# Patient Record
Sex: MALE | Race: WHITE | Employment: STUDENT | URBAN - METROPOLITAN AREA
[De-identification: names, ages, dates, MRNs, and addresses within clinical notes are randomized per-mention and may not be internally consistent; named-entity substitution may affect disease eponyms.]

---

## 2024-10-31 NOTE — PROGRESS NOTES
Weston Mcginnis   1980, 44 y.o. male   [id number]       Referring Provider: Oliver Will DO  Referral Type: In an order in Epic    Reason for Visit: Evaluation of the cause of disorders of hearing, tinnitus, or balance.    ADULT AUDIOLOGIC EVALUATION      Weston Mcginnis is a 44 y.o. male seen today, 11/1/2024 , for an initial audiologic evaluation.  Patient was seen by Oliver Will DO following today's evaluation. Patient was alone.    AUDIOLOGIC AND OTHER PERTINENT MEDICAL HISTORY:      Weston Mcginnis noted tinnitus and history of occupational noise exposure.  Patient reports constant left ear tinnitus after having a cold around two years ago.  He has a history of occupational and  noise exposure, [employer] and [employer], with the use of hearing protection.      Weston Mcginnis denied otalgia, aural fullness, dizziness, history of head trauma, history of ear surgery, and family history of hearing loss.    Date: 11/1/2024     IMPRESSIONS:      Normal middle ear pressure and compliance, bilaterally.  Abnormal asymmetric high frequency hearing sensitivity, left worse than right, which can coincide with tinnitus symptoms.  Word understanding was excellent presented at normal conversation level, bilaterally.  Discussed tinnitus management strategies.  Follow medical recommendations of Oliver Will DO.     ASSESSMENT AND FINDINGS:     Otoscopy revealed: Clear ear canals bilaterally    RIGHT EAR:  Hearing Sensitivity: Normal hearing sensitivity to a mild sensorineural hearing loss from 250-8000 Hz  Speech Recognition Threshold: 0 dB HL  Word Recognition:Excellent (100%), based on NU-6 Ordered-by-Difficulty 10-word list at 40 dBHL using recorded speech stimuli.    Tympanometry: Normal peak pressure and compliance, Type A tympanogram, consistent with normal middle ear function.      LEFT EAR:  Hearing Sensitivity: Normal hearing sensitivity from 250-4000 Hz sloping to a moderately severe sensorineural hearing loss from 3366-2023 Hz  Speech

## 2024-10-31 NOTE — PROGRESS NOTES
[facility]  DIVISION OF OTOLARYNGOLOGY- HEAD & NECK SURGERY  CONSULT    Patient Name: Weston Mcginnis  Medical Record Number:  [id number]  Primary Care Physician:  Amara Gilmore APRN - NP  Date of Consultation: 11/1/2024    Chief Complaint:   Chief Complaint   Patient presents with    Tinnitus     Muffled hearing & left ear tinnitus.      HISTORY OF PRESENT ILLNESS  Weston is a(n) 44 y.o. male who Presents for evaluation of left-sided ringing and muffled hearing.  He states this been going on for approximately 2 years.  He had URI and after the URI noticed decreased hearing on the left side with some ringing.  He was placed on steroids without any relief of his symptoms.  He states that this ringing has improved but he is still hearing consistently  There is no problem list on file for this patient.    No past surgical history on file.  No family history on file.  Social History     Socioeconomic History    Marital status:      Spouse name: Not on file    Number of children: Not on file    Years of education: Not on file    Highest education level: Not on file   Occupational History    Not on file   Tobacco Use    Smoking status: Never    Smokeless tobacco: Never   Vaping Use    Vaping status: Never Used   Substance and Sexual Activity    Alcohol use: Yes     Alcohol/week: 2.0 standard drinks of alcohol     Types: 2 Cans of beer per week     Comment: per day    Drug use: Never    Sexual activity: Not on file   Other Topics Concern    Not on file   Social History Narrative    Not on file     Social Determinants of Health     Financial Resource Strain: Not on file   Food Insecurity: Not on file   Transportation Needs: Not on file   Physical Activity: Not on file   Stress: Not on file   Social Connections: Not on file   Intimate Partner Violence: Not on file   Housing Stability: Not on file     DRUG/FOOD ALLERGIES: Patient has no known allergies.  CURRENT MEDICATIONS  Prior to Admission medications

## 2024-11-01 ENCOUNTER — PROCEDURE VISIT (OUTPATIENT)
Dept: AUDIOLOGY | Age: 44
End: 2024-11-01

## 2024-11-01 ENCOUNTER — OFFICE VISIT (OUTPATIENT)
Dept: ENT CLINIC | Age: 44
End: 2024-11-01
Payer: OTHER GOVERNMENT

## 2024-11-01 VITALS
DIASTOLIC BLOOD PRESSURE: 88 MMHG | SYSTOLIC BLOOD PRESSURE: 136 MMHG | BODY MASS INDEX: 35.65 KG/M2 | HEIGHT: 65 IN | WEIGHT: 214 LBS | HEART RATE: 62 BPM

## 2024-11-01 DIAGNOSIS — H90.3 ASYMMETRIC SNHL (SENSORINEURAL HEARING LOSS): Primary | ICD-10-CM

## 2024-11-01 DIAGNOSIS — H90.3 SENSORINEURAL HEARING LOSS, BILATERAL: Primary | ICD-10-CM

## 2024-11-01 DIAGNOSIS — H93.12 TINNITUS OF LEFT EAR: ICD-10-CM

## 2024-11-01 PROCEDURE — 99204 OFFICE O/P NEW MOD 45 MIN: CPT | Performed by: STUDENT IN AN ORGANIZED HEALTH CARE EDUCATION/TRAINING PROGRAM

## 2024-11-01 RX ORDER — VALACYCLOVIR HYDROCHLORIDE 500 MG/1
TABLET, FILM COATED ORAL
COMMUNITY
Start: 2024-10-31

## 2024-11-01 RX ORDER — ASPIRIN 325 MG
1 TABLET ORAL DAILY
COMMUNITY

## 2024-11-01 RX ORDER — OMEPRAZOLE 40 MG/1
CAPSULE, DELAYED RELEASE ORAL
COMMUNITY
Start: 2024-10-31

## 2024-11-01 ASSESSMENT — ENCOUNTER SYMPTOMS
COUGH: 0
VOMITING: 0
SHORTNESS OF BREATH: 0
RHINORRHEA: 0
NAUSEA: 0
EYE PAIN: 0

## 2025-04-28 NOTE — PROGRESS NOTES
[facility]  DIVISION OF OTOLARYNGOLOGY- HEAD & NECK SURGERY  CONSULT    Patient Name: Weston Mcginnis  Medical Record Number:  [id number]  Primary Care Physician:  Amara Gilmore APRN - NP  Date of Consultation: 5/9/2025    Chief Complaint:   Chief Complaint   Patient presents with    Hearing Problem    Follow-up     Hearing loss 6 month follow up, feels less noticeable.      HISTORY OF PRESENT ILLNESS  Weston is a(n) 44 y.o. male who Presents for evaluation of left-sided ringing and muffled hearing.  He states this been going on for approximately 2 years.  He had URI and after the URI noticed decreased hearing on the left side with some ringing.  He was placed on steroids without any relief of his symptoms.  He states that this ringing has improved but he is still hearing consistently    Interval History 05/09/25  Weston is having less bother from the ringing.    There is no problem list on file for this patient.    No past surgical history on file.  No family history on file.  Social History     Socioeconomic History    Marital status:      Spouse name: Not on file    Number of children: Not on file    Years of education: Not on file    Highest education level: Not on file   Occupational History    Not on file   Tobacco Use    Smoking status: Never    Smokeless tobacco: Never   Vaping Use    Vaping status: Never Used   Substance and Sexual Activity    Alcohol use: Yes     Alcohol/week: 2.0 standard drinks of alcohol     Types: 2 Cans of beer per week     Comment: per day    Drug use: Never    Sexual activity: Not on file   Other Topics Concern    Not on file   Social History Narrative    Not on file     Social Drivers of Health     Financial Resource Strain: Not on file   Food Insecurity: Not on file   Transportation Needs: Not on file   Physical Activity: Not on file   Stress: Not on file   Social Connections: Not on file   Intimate Partner Violence: Not on file   Housing Stability: Not on file     DRUG/FOOD

## 2025-05-08 NOTE — PROGRESS NOTES
Weston Mcginnis   1980, 44 y.o. male   [id number]       Referring Provider: Oliver Will DO  Referral Type: In an order in Epic    Reason for Visit: Re-evaluation of disorder as deemed medically necessary by referring provider    ADULT AUDIOLOGIC EVALUATION      Weston Mcginnis is a 44 y.o. male seen today, 5/9/2025 , for a recheck audiologic evaluation.  Patient was seen by Oliver Will DO following today's evaluation. Patient was alone.    AUDIOLOGIC AND OTHER PERTINENT MEDICAL HISTORY:      Weston Mcginnis noted tinnitus.  Patient reports less intrusive tinnitus in the left ear since previous audiologic evaluation on 11/01/2024.  He still experiences sensitivity to loud sounds in the left ear.    Weston Mcginnis denied otalgia, aural fullness, and dizziness.    Date: 5/9/2025     IMPRESSIONS:      Normal middle ear pressure and compliance, bilaterally.  Abnormal asymmetric high frequency hearing sensitivity, left worse than right, coinciding with tinnitus symptom.  Word understanding was excellent presented at normal conversation levels, bilaterally.  Stable hearing sensitivity since previous audiologic evaluation.  Follow medical recommendations of Oliver Will DO.     ASSESSMENT AND FINDINGS:     Otoscopy revealed: Clear ear canals bilaterally    RIGHT EAR:  Hearing Sensitivity:Hearing sensitivity within normal limits from 250-8000 Hz  Speech Recognition Threshold: 0 dB HL  Word Recognition:Excellent (100%), based on NU-6 Ordered-by-Difficulty 10-word list at 40 dBHL using recorded speech stimuli.    Tympanometry: Normal peak pressure and compliance, Type A tympanogram, consistent with normal middle ear function.      LEFT EAR:  Hearing Sensitivity: Normal hearing sensitivity from 250-3000 Hz sloping to a mild to moderately severe sensorineural hearing loss from 8202-0901 Hz  Speech Recognition Threshold: 0 dB HL  Word Recognition: Excellent (100%), based on NU-6 Ordered-by-Difficulty 10-word list at 40 dBHL using recorded speech

## 2025-05-09 ENCOUNTER — OFFICE VISIT (OUTPATIENT)
Dept: ENT CLINIC | Age: 45
End: 2025-05-09
Payer: OTHER GOVERNMENT

## 2025-05-09 ENCOUNTER — PROCEDURE VISIT (OUTPATIENT)
Dept: AUDIOLOGY | Age: 45
End: 2025-05-09

## 2025-05-09 VITALS
HEIGHT: 68 IN | BODY MASS INDEX: 30.46 KG/M2 | HEART RATE: 65 BPM | WEIGHT: 201 LBS | SYSTOLIC BLOOD PRESSURE: 130 MMHG | DIASTOLIC BLOOD PRESSURE: 84 MMHG

## 2025-05-09 DIAGNOSIS — H93.12 TINNITUS OF LEFT EAR: ICD-10-CM

## 2025-05-09 DIAGNOSIS — H90.3 ASYMMETRIC SNHL (SENSORINEURAL HEARING LOSS): Primary | ICD-10-CM

## 2025-05-09 DIAGNOSIS — H90.42 SENSORINEURAL HEARING LOSS (SNHL) OF LEFT EAR WITH UNRESTRICTED HEARING OF RIGHT EAR: Primary | ICD-10-CM

## 2025-05-09 PROCEDURE — 99214 OFFICE O/P EST MOD 30 MIN: CPT | Performed by: STUDENT IN AN ORGANIZED HEALTH CARE EDUCATION/TRAINING PROGRAM
